# Patient Record
Sex: MALE | Race: BLACK OR AFRICAN AMERICAN | NOT HISPANIC OR LATINO | URBAN - NONMETROPOLITAN AREA
[De-identification: names, ages, dates, MRNs, and addresses within clinical notes are randomized per-mention and may not be internally consistent; named-entity substitution may affect disease eponyms.]

---

## 2019-02-15 ENCOUNTER — IMPORTED ENCOUNTER (OUTPATIENT)
Dept: URBAN - NONMETROPOLITAN AREA CLINIC 1 | Facility: CLINIC | Age: 79
End: 2019-02-15

## 2019-02-15 PROBLEM — H02.432: Noted: 2019-02-15

## 2019-02-15 PROBLEM — H02.431: Noted: 2019-02-15

## 2019-02-15 PROCEDURE — 92012 INTRM OPH EXAM EST PATIENT: CPT

## 2019-02-15 NOTE — PATIENT DISCUSSION
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 - Discussed that if skin were removed patient would not be able to close eys properly.

## 2020-01-15 ENCOUNTER — IMPORTED ENCOUNTER (OUTPATIENT)
Dept: URBAN - NONMETROPOLITAN AREA CLINIC 1 | Facility: CLINIC | Age: 80
End: 2020-01-15

## 2020-01-15 PROBLEM — Z96.1: Noted: 2020-01-15

## 2020-01-15 PROBLEM — H52.4: Noted: 2020-01-15

## 2020-01-15 PROBLEM — H02.432: Noted: 2020-01-15

## 2020-01-15 PROBLEM — H02.431: Noted: 2020-01-15

## 2020-01-15 PROCEDURE — 92014 COMPRE OPH EXAM EST PT 1/>: CPT

## 2020-01-15 PROCEDURE — 92015 DETERMINE REFRACTIVE STATE: CPT

## 2020-01-15 NOTE — PATIENT DISCUSSION
Presbyopia OUDiscussed refractive status in detail with patient. New glasses Rx given today. Continue to monitor. P/C IOL OUDiscussed diagnosis in detail with patient. Both intraocular implants in place and stable. Continue to monitor. 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 - Discussed that if skin were removed patient would not be able to close eys properly.

## 2020-07-20 ENCOUNTER — IMPORTED ENCOUNTER (OUTPATIENT)
Dept: URBAN - NONMETROPOLITAN AREA CLINIC 1 | Facility: CLINIC | Age: 80
End: 2020-07-20

## 2020-07-20 PROBLEM — H52.4: Noted: 2020-07-20

## 2020-07-20 PROBLEM — Z96.1: Noted: 2020-07-20

## 2020-07-20 PROBLEM — H02.431: Noted: 2020-07-20

## 2020-07-20 PROBLEM — H02.432: Noted: 2020-07-20

## 2020-07-20 PROCEDURE — 99214 OFFICE O/P EST MOD 30 MIN: CPT

## 2020-07-20 NOTE — PATIENT DISCUSSION
Ptosis Bilateral upper lids - Explained diagnosis in detail with patient- patient came to us back in 2019 but surgery was not recommended due to not having enough skin and there was a possibility that his eyelids would not close all the way. - patient states that after he came to us he went to [facility] [address] Dr[address] Girish Carolina to see if they could do it. Wife states that they did have another surgery in [place] 03/2020 by  Dr. Girish Carolina to  try to fix the eyelids. and now patient c/o assymetry of the eyelids and are unhappy with results. - Do not recommend that I do another surgery at this time. - Discussed that if we did another surgery there is a chance  his eyelids would not close all the way as previously discussed. - Patient has several options. patient can go back to Dr. Girish Carolina and tell him about his eyelids or he can go to [facility]. - Patient would like referral to [place]. - patient also c/o  difficulty focusing on the road and notes history of stroke. - refer to Oculoplastic surgeon at [facility] for the eyelids and Neurophthalmologist due to history of stroke.  - Patient okay with plan

## 2021-08-16 ENCOUNTER — IMPORTED ENCOUNTER (OUTPATIENT)
Dept: URBAN - NONMETROPOLITAN AREA CLINIC 1 | Facility: CLINIC | Age: 81
End: 2021-08-16

## 2021-08-16 ENCOUNTER — PREPPED CHART (OUTPATIENT)
Dept: RURAL CLINIC 3 | Facility: CLINIC | Age: 81
End: 2021-08-16

## 2021-08-16 PROBLEM — H53.2: Noted: 2021-08-16

## 2021-08-16 PROBLEM — H02.431: Noted: 2021-08-16

## 2021-08-16 PROBLEM — H02.432: Noted: 2021-08-16

## 2021-08-16 PROBLEM — Z96.1: Noted: 2021-08-16

## 2021-08-16 PROBLEM — H50.15: Noted: 2021-08-16

## 2021-08-16 PROCEDURE — 92014 COMPRE OPH EXAM EST PT 1/>: CPT

## 2021-08-16 NOTE — PATIENT DISCUSSION
From previous notes;Ptosis bilateral upper lids- Explained diagnosis in detail with pt.-Patient came to us back in 2019 but surgery was not recommended due to not having enough skin and there was a possibility that his eyelids would not close all the way. - Pt states that after he came to us he went to [address] [place] [address] to see if they could do it. Wife states that they did have another surgery in [place] by Caryle Muta to try and fix the eyelids and now pt c/o assymetry of the eyelids and are unhappy with results. Do not recommend that I do another surgery at this time.-Discussed that if we did another surgery there is a chance his eyelids would not close all the way as previously discussed.

## 2021-08-16 NOTE — PATIENT DISCUSSION
Discussed dx w/pt and family members present. 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 Explained pt this is cause of his diplopia. Discussed surgical repair and recommend evaluation @ [place] for management. Pt and wife agree.

## 2021-08-16 NOTE — PATIENT DISCUSSION
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 NOTES BELOW FROM PREVIOUS : Ptosis Bilateral upper lids - Explained diagnosis in detail with patient- patient came to us back in 2019 but surgery was not recommended due to not having enough skin and there was a possibility that his eyelids would not close all the way. - patient states that after he came to us he went to [facility] Dr. Nickolas Temple to see if they could do it. Wife states that they did have another surgery in [place] 03/2020 by  Dr. Nickolas Temple to  try to fix the eyelids. and now patient c/o assymetry of the eyelids and are unhappy with results. - Do not recommend that I do another surgery at this time. - Discussed that if we did another surgery there is a chance  his eyelids would not close all the way as previously discussed.

## 2022-02-01 ASSESSMENT — TONOMETRY
OS_IOP_MMHG: 20
OD_IOP_MMHG: 20

## 2022-02-01 ASSESSMENT — VISUAL ACUITY
OS_CC: 20/40
OD_CC: 20/30

## 2022-03-23 ENCOUNTER — ESTABLISHED PATIENT (OUTPATIENT)
Dept: RURAL CLINIC 3 | Facility: CLINIC | Age: 82
End: 2022-03-23

## 2022-03-23 DIAGNOSIS — H02.433: ICD-10-CM

## 2022-03-23 DIAGNOSIS — H52.4: ICD-10-CM

## 2022-03-23 DIAGNOSIS — Z96.1: ICD-10-CM

## 2022-03-23 PROCEDURE — 92015 DETERMINE REFRACTIVE STATE: CPT

## 2022-03-23 PROCEDURE — 99214 OFFICE O/P EST MOD 30 MIN: CPT

## 2022-03-23 ASSESSMENT — TONOMETRY
OD_IOP_MMHG: 16
OS_IOP_MMHG: 16

## 2022-03-23 ASSESSMENT — VISUAL ACUITY
OD_CC: 20/30-1
OS_CC: 20/40+2
OS_PH: 20/30

## 2022-03-23 NOTE — PATIENT DISCUSSION
From previous notes;Ptosis bilateral upper lids- Explained diagnosis in detail with pt.-Patient came to us back in 2019 but surgery was not recommended due to not having enough skin and there was a possibility that his eyelids would not close all the way. - Pt states that after he came to us he went to [address] [place] [address] to see if they could do it. Wife states that they did have another surgery in [place] by Eric Nuñez to try and fix the eyelids and now pt c/o assymetry of the eyelids and are unhappy with results. Do not recommend that I do another surgery at this time.-Discussed that if we did another surgery there is a chance his eyelids would not close all the way as previously discussed.

## 2022-03-23 NOTE — PATIENT DISCUSSION
Discussed dx w/pt and family members present. 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 Explained pt this is cause of his diplopia. Discussed surgical repair and recommend evaluation @ [facility] for management. Pt and wife agree.

## 2022-04-09 ASSESSMENT — VISUAL ACUITY
OD_SC: 20/20-2
OS_SC: 20/30
OD_SC: 20/30
OS_SC: 20/25-2
OD_SC: 20/30
OS_SC: 20/400
OD_SC: 20/400
OS_SC: 20/40

## 2022-04-09 ASSESSMENT — TONOMETRY
OD_IOP_MMHG: 20
OS_IOP_MMHG: 20
OS_IOP_MMHG: 14
OD_IOP_MMHG: 14

## 2022-06-22 ENCOUNTER — EMERGENCY VISIT (OUTPATIENT)
Dept: RURAL CLINIC 3 | Facility: CLINIC | Age: 82
End: 2022-06-22

## 2022-06-22 DIAGNOSIS — H16.223: ICD-10-CM

## 2022-06-22 PROCEDURE — 99213 OFFICE O/P EST LOW 20 MIN: CPT

## 2022-06-22 ASSESSMENT — VISUAL ACUITY
OS_CC: 20/25-2
OS_CC: 20/70
OD_CC: 20/25-2
OD_CC: 20/70

## 2022-06-22 ASSESSMENT — TONOMETRY
OD_IOP_MMHG: 13
OS_IOP_MMHG: 14

## 2022-06-22 NOTE — PATIENT DISCUSSION
Discussed diagnosis with patient. Patient has tried Restasis in the past and is currently using Refresh PRN. Recommend start Cequa BID OU, sample given today and Rx sent to pharmacy. Continue to monitor.

## 2022-06-22 NOTE — PATIENT DISCUSSION
Recommend patient take his glasses back to [facility] to have glasses adjusted to see better out of his progressive bifocals. Discussed might have to switch back to a lined bifocal. Continue to monitor.

## 2022-06-22 NOTE — PATIENT DISCUSSION
From previous notes;Ptosis bilateral upper lids- Explained diagnosis in detail with pt.-Patient came to us back in 2019 but surgery was not recommended due to not having enough skin and there was a possibility that his eyelids would not close all the way. - Pt states that after he came to us he went to [address] [place] [address] to see if they could do it. Wife states that they did have another surgery in [place] by Nelson Glover to try and fix the eyelids and now pt c/o assymetry of the eyelids and are unhappy with results. Do not recommend that I do another surgery at this time.-Discussed that if we did another surgery there is a chance his eyelids would not close all the way as previously discussed.

## 2023-04-19 ENCOUNTER — EMERGENCY VISIT (OUTPATIENT)
Dept: RURAL CLINIC 3 | Facility: CLINIC | Age: 83
End: 2023-04-19

## 2023-04-19 DIAGNOSIS — H10.13: ICD-10-CM

## 2023-04-19 PROCEDURE — 99213 OFFICE O/P EST LOW 20 MIN: CPT

## 2023-04-19 ASSESSMENT — TONOMETRY
OD_IOP_MMHG: 14
OS_IOP_MMHG: 13

## 2023-04-19 ASSESSMENT — VISUAL ACUITY
OS_CC: 20/60-
OD_CC: 20/30-2

## 2023-07-31 ENCOUNTER — CONSULTATION/EVALUATION (OUTPATIENT)
Dept: RURAL CLINIC 3 | Facility: CLINIC | Age: 83
End: 2023-07-31

## 2023-07-31 DIAGNOSIS — H02.433: ICD-10-CM

## 2023-07-31 PROCEDURE — 99214 OFFICE O/P EST MOD 30 MIN: CPT

## 2023-07-31 ASSESSMENT — VISUAL ACUITY
OS_CC: 20/60
OD_CC: 20/30
OS_PH: 20/30

## 2023-07-31 ASSESSMENT — TONOMETRY
OS_IOP_MMHG: 13
OD_IOP_MMHG: 14

## 2024-05-03 ENCOUNTER — ESTABLISHED PATIENT (OUTPATIENT)
Dept: RURAL CLINIC 3 | Facility: CLINIC | Age: 84
End: 2024-05-03

## 2024-05-03 DIAGNOSIS — H10.13: ICD-10-CM

## 2024-05-03 DIAGNOSIS — H16.223: ICD-10-CM

## 2024-05-03 DIAGNOSIS — H52.4: ICD-10-CM

## 2024-05-03 PROCEDURE — 92015 DETERMINE REFRACTIVE STATE: CPT

## 2024-05-03 PROCEDURE — 92014 COMPRE OPH EXAM EST PT 1/>: CPT

## 2024-05-03 ASSESSMENT — VISUAL ACUITY
OD_PH: 20/30
OS_SC: 20/40
OD_SC: 20/40-2

## 2024-05-03 ASSESSMENT — TONOMETRY
OS_IOP_MMHG: 17
OD_IOP_MMHG: 17

## 2025-03-25 ENCOUNTER — COMPREHENSIVE EXAM (OUTPATIENT)
Age: 85
End: 2025-03-25

## 2025-03-25 DIAGNOSIS — H10.13: ICD-10-CM

## 2025-03-25 DIAGNOSIS — H16.223: ICD-10-CM

## 2025-03-25 DIAGNOSIS — H52.4: ICD-10-CM

## 2025-03-25 DIAGNOSIS — Z96.1: ICD-10-CM

## 2025-03-25 PROCEDURE — 92014 COMPRE OPH EXAM EST PT 1/>: CPT

## 2025-03-25 PROCEDURE — 92015 DETERMINE REFRACTIVE STATE: CPT
